# Patient Record
Sex: MALE | Race: WHITE | Employment: FULL TIME | URBAN - METROPOLITAN AREA
[De-identification: names, ages, dates, MRNs, and addresses within clinical notes are randomized per-mention and may not be internally consistent; named-entity substitution may affect disease eponyms.]

---

## 2017-03-12 ENCOUNTER — HOSPITAL ENCOUNTER (OUTPATIENT)
Dept: MRI IMAGING | Age: 52
Discharge: HOME | End: 2017-03-12
Attending: ORTHOPAEDIC SURGERY
Payer: COMMERCIAL

## 2017-03-12 DIAGNOSIS — M48.02 CERVICAL SPINAL STENOSIS: ICD-10-CM

## 2017-03-12 PROCEDURE — 72141 MRI NECK SPINE W/O DYE: CPT

## 2017-03-26 ENCOUNTER — HOSPITAL ENCOUNTER (OUTPATIENT)
Dept: MRI IMAGING | Age: 52
Discharge: HOME | End: 2017-03-26
Attending: ORTHOPAEDIC SURGERY
Payer: COMMERCIAL

## 2017-03-26 DIAGNOSIS — M75.41 IMPINGEMENT SYNDROME OF RIGHT SHOULDER: ICD-10-CM

## 2017-03-26 PROCEDURE — 73221 MRI JOINT UPR EXTREM W/O DYE: CPT

## 2020-03-02 ENCOUNTER — HOSPITAL ENCOUNTER (OUTPATIENT)
Dept: GENERAL RADIOLOGY | Age: 55
Discharge: HOME | End: 2020-03-02
Attending: INTERNAL MEDICINE
Payer: COMMERCIAL

## 2020-03-02 DIAGNOSIS — R05.9 COUGH: ICD-10-CM

## 2020-03-02 PROCEDURE — 71046 X-RAY EXAM CHEST 2 VIEWS: CPT

## 2023-01-23 ENCOUNTER — APPOINTMENT (OUTPATIENT)
Dept: GENERAL RADIOLOGY | Age: 58
End: 2023-01-23
Payer: COMMERCIAL

## 2023-01-23 ENCOUNTER — HOSPITAL ENCOUNTER (EMERGENCY)
Age: 58
Discharge: HOME | End: 2023-01-23
Payer: COMMERCIAL

## 2023-01-23 VITALS
RESPIRATION RATE: 16 BRPM | SYSTOLIC BLOOD PRESSURE: 153 MMHG | DIASTOLIC BLOOD PRESSURE: 94 MMHG | HEART RATE: 97 BPM | OXYGEN SATURATION: 99 % | WEIGHT: 175 LBS | HEIGHT: 73 IN | BODY MASS INDEX: 23.19 KG/M2 | TEMPERATURE: 98.3 F

## 2023-01-23 DIAGNOSIS — R05.1 ACUTE COUGH: ICD-10-CM

## 2023-01-23 DIAGNOSIS — B34.9 VIRAL ILLNESS: Primary | ICD-10-CM

## 2023-01-23 DIAGNOSIS — R07.89 CHEST TIGHTNESS: ICD-10-CM

## 2023-01-23 LAB
BDY SITE: ABNORMAL
COHGB MFR BLD: 2.2 % (ref 1–2)
METHGB MFR BLD: 0.3 % (ref 0–1.4)
OXYHGB MFR BLD: 37.9 % (ref 94–97)
SAO2 % BLD: 39 % (ref 95–99)
SPECIMEN SITE: ABNORMAL

## 2023-01-23 PROCEDURE — 99283 EMERGENCY DEPT VISIT LOW MDM: CPT

## 2023-01-23 PROCEDURE — 82375 ASSAY CARBOXYHB QUANT: CPT

## 2023-01-23 PROCEDURE — 71046 X-RAY EXAM CHEST 2 VIEWS: CPT

## 2023-01-23 NOTE — ED TRIAGE NOTES
Patient reports he has had flu-like symptoms for the last 3 days- reports he was at his PCP today for evaluation and they referred him here for a Carbon Monoxide test.    Patient reports his job had a Carbon Monoxide leak a week ago and he was in the building for approximately 1 hour.

## 2023-01-23 NOTE — Clinical Note
[address]  [facility] EMERGENCY DEPT  [address] [id number] [phone]    Work/School Note    Date: 1/23/2023    To Whom It May concern:    Luigi Lott was seen and treated today in the emergency room by the following provider(s):  Attending Provider: Evelyn Dee MD  Physician Assistant: Urbano Ellis PA-C. Luigi Lott is excused from work/school on 01/23/23 and 01/24/23. He is medically clear to return to work/school on 1/25/2023.        Sincerely,          Dorothy Patiño PA-C

## 2023-01-23 NOTE — ED PROVIDER NOTES
Patient is a 26-year-old male with history of hypertension presenting ambulatory with a PCP referral.  Patient states he was exposed to carbon monoxide for 1 hour at his place of employment 1 week ago today and 2 days later began to experience flulike symptoms including malaise, chest tightness, cough, fatigue. He states he was seen by his doctor 3 days ago and was tested for flu and COVID which came back negative. He spoke to his PCP today stating he was not feeling better and informed his doctor about his exposure to carbon monoxide at which point his PCP referred him here. He denies shortness of breath, chest pain, headache, lightheadedness, nausea, vomiting, diarrhea, syncope, altered mental status, skin changes, and abdominal pain. Other  Pertinent negatives include no chest pain, no abdominal pain, no headaches and no shortness of breath. Past Medical History:   Diagnosis Date    Allergic rhinitis     Asthma     childhood     Cervical radiculopathy     Depression     EBT (electron beam tomography) abnormal 8/20/2015    ED (erectile dysfunction)     HTN (hypertension) 9/14/2011    Low back pain     and sciatica    Otalgia     Plantar fasciitis     Rotator cuff tendonitis, right        Past Surgical History:   Procedure Laterality Date    HX COLONOSCOPY  08/14/2014    nl. f/u 5 y Daniel Lozoya    HX COLONOSCOPY  11/01/2019    2 polypectomies . Moderate Sigmoid Diverticuli. External hemorrhoids. f/u 5 yrs.  Dr. Analy Neri      [address] [place]      left index finger severe laceration requiring surgery    HX TONSILLECTOMY           Family History:   Problem Relation Age of Onset    Cancer Sister 3        kidney    Heart Disease Father         CABG    OSTEOARTHRITIS Father     Hypertension Mother     Dementia Mother     OSTEOARTHRITIS Mother        Social History     Socioeconomic History    Marital status:      Spouse name: Not on file    Number of children: Not on file    Years of education: Not on file    Highest education level: Not on file   Occupational History    Not on file   Tobacco Use    Smoking status: Former    Smokeless tobacco: Never   Substance and Sexual Activity    Alcohol use: Yes     Comment: 2 drinks per month    Drug use: No    Sexual activity: Not on file   Other Topics Concern    Not on file   Social History Narrative    Not on file     Social Determinants of Health     Financial Resource Strain: Not on file   Food Insecurity: Not on file   Transportation Needs: Not on file   Physical Activity: Not on file   Stress: Not on file   Social Connections: Not on file   Intimate Partner Violence: Not on file   Housing Stability: Not on file         ALLERGIES: Effexor [venlafaxine], Lexapro [escitalopram], Maxzide [triamterene-hydrochlorothiazid], and Prinivil [lisinopril]    Review of Systems   Constitutional:  Positive for fever. HENT:  Negative for congestion. Eyes:  Negative for pain. Respiratory:  Positive for chest tightness. Negative for shortness of breath. Cardiovascular:  Negative for chest pain. Gastrointestinal:  Negative for abdominal pain, diarrhea, nausea and vomiting. Genitourinary:  Negative for dysuria. Musculoskeletal:  Negative for arthralgias and myalgias. Skin:  Negative for color change. Neurological:  Negative for weakness, light-headedness, numbness and headaches. Psychiatric/Behavioral:  Negative for behavioral problems and confusion. Vitals:    01/23/23 1704   BP: (!) 153/94   Pulse: 97   Resp: 16   Temp: 98.3 °F (36.8 °C)   SpO2: 99%   Weight: 79.4 kg (175 lb)   Height: 6' 1\" (1.854 m)            Physical Exam  Constitutional:       General: He is not in acute distress. Appearance: Normal appearance. He is not ill-appearing. HENT:      Head: Normocephalic and atraumatic.       Right Ear: External ear normal.      Left Ear: External ear normal.      Nose: Nose normal.      Mouth/Throat:      Mouth: Mucous membranes are moist. Eyes:      Extraocular Movements: Extraocular movements intact. Conjunctiva/sclera: Conjunctivae normal.      Pupils: Pupils are equal, round, and reactive to light. Cardiovascular:      Rate and Rhythm: Normal rate. Pulses: Normal pulses. Heart sounds: Normal heart sounds. Pulmonary:      Effort: Pulmonary effort is normal.      Breath sounds: Normal breath sounds. Abdominal:      General: Abdomen is flat. There is no distension. Palpations: Abdomen is soft. Musculoskeletal:         General: Normal range of motion. Cervical back: Normal range of motion. Skin:     General: Skin is warm and dry. Capillary Refill: Capillary refill takes less than 2 seconds. Comments: No pallor   Neurological:      General: No focal deficit present. Mental Status: He is alert and oriented to person, place, and time. Psychiatric:         Mood and Affect: Mood normal.         Behavior: Behavior normal.        Medical Decision Making  Differentials: Carbon monoxide poisoning, pneumonia, COVID-19, influenza, URI. 42-year-old male presenting with flulike symptoms and request for carbon monoxide test from PCP. Patient was exposed to carbon monoxide for 1 hour 1 week ago. He began feeling flulike symptoms including malaise, cough, fevers 2 days after exposure. Was seen by his PCP 3 days ago and swabbed for COVID and flu and was negative. Patient informed PCP of exposure and was advised to present to ER for testing. Patient is non-smoker. No altered mental status, chest pain, headaches, syncope, abdominal pain, skin changes. Patient is afebrile and vital signs are remarkable for an elevated blood pressure but are otherwise within normal limits including satting at 99% on room air. Will order chest x-ray and carboxyhemoglobin levels. Chest x-ray was unremarkable, carboxyhemoglobin was 2.2 and [id number] was 37.9.   Discussed these values with Dr. Alejandro Flood and it was determined that the carboxyhemoglobin level was at the upper end of normal limit and that the [id number] was due to the sample being venous blood. Patient can be discharged home in the setting of a reassuring clinical presentation and these values. Discussed results with patient and strict ED return precautions. Discussed my clinical impression(s), any labs and/or radiology results with the patient. I answered any questions and addressed any concerns. Discussed the importance of following up with their primary care physician and/or specialist(s). Discussed signs or symptoms that would warrant return back to the ER for further evaluation. The patient is agreeable with discharge. Please note that this dictation was completed with [employer], the computer voice recognition software. Quite often unanticipated grammatical, syntax, homophones, and other interpretive errors are inadvertently transcribed by the computer software. Please disregard these errors. Please excuse any errors that have escaped final proofreading. Amount and/or Complexity of Data Reviewed  Radiology: ordered.            Procedures

## 2023-01-23 NOTE — DISCHARGE INSTRUCTIONS
Today you were seen due to a referral for carbon monoxide levels. Your chest x-ray came back normal and your carbon monoxide levels were at the upper limit of normal.  You can take Tylenol and ibuprofen alternating every 3 hours as needed for fevers. You can take NyQuil or DayQuil for your flulike symptoms. Follow-up with your PCP as needed and return to this department if you experience worsening symptoms or new worrying symptoms. Thank you for allowing us to provide you with medical care today. We realize that you have many choices for your emergency care needs. We thank you for choosing [facility]. Please choose us in the future for any continued health care needs. The exam and treatment you received in the Emergency Department were for an emergent problem and are not intended as complete care. It is important that you follow up with a doctor, nurse practitioner, or physician's assistant for ongoing care. If your symptoms worsen or you do not improve as expected and you are unable to reach your usual health care provider, you should return to the Emergency Department. We are available 24 hours a day. Please make an appointment with your health care provider(s) for follow up of your Emergency Department visit. Take this sheet with you when you go to your follow-up visit.

## 2023-05-17 RX ORDER — OMEPRAZOLE 20 MG/1
1 CAPSULE, DELAYED RELEASE ORAL DAILY
COMMUNITY
Start: 2020-09-18

## 2023-05-17 RX ORDER — TADALAFIL 20 MG/1
20 TABLET ORAL
COMMUNITY
Start: 2022-08-02

## 2023-05-17 RX ORDER — LOSARTAN POTASSIUM 100 MG/1
100 TABLET ORAL DAILY
COMMUNITY
Start: 2023-01-13 | End: 2023-07-06

## 2024-01-06 ENCOUNTER — TELEPHONE (OUTPATIENT)
Age: 59
End: 2024-01-06

## 2024-01-06 DIAGNOSIS — U07.1 COVID-19: Primary | ICD-10-CM

## 2024-01-06 NOTE — TELEPHONE ENCOUNTER
Pc + COVID, discussed isolation, masking, Paxlovid omitting Simvastatin for next 7 d,  ERX Paxlovid sent. Call back here prn